# Patient Record
Sex: MALE | Race: WHITE
[De-identification: names, ages, dates, MRNs, and addresses within clinical notes are randomized per-mention and may not be internally consistent; named-entity substitution may affect disease eponyms.]

---

## 2017-10-01 ENCOUNTER — HOSPITAL ENCOUNTER (EMERGENCY)
Dept: HOSPITAL 59 - ER | Age: 47
Discharge: HOME | End: 2017-10-01
Payer: COMMERCIAL
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
## 2017-10-01 DIAGNOSIS — S43.102A: Primary | ICD-10-CM

## 2017-10-01 PROCEDURE — 99283 EMERGENCY DEPT VISIT LOW MDM: CPT
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
## 2017-10-02 NOTE — RADIOLOGY REPORT
EXAM:  LEFT SHOULDER



HISTORY:  PATIENT FELL ON LEFT SHOULDER WITH PAIN.  



TECHNIQUE:  Three views of the left shoulder were obtained.  



Comparison:  None.  



Encounter:  Initial.



FINDINGS:  The glenohumeral joint appears intact with no fracture or 
dislocation of the glenohumeral joint evident.  



There does appear to be some deformity of the lateral aspect of the clavicle 
probably representing an old healed fracture although less obvious on a prior 
chest x-ray of 6/22/17.  In addition, there is now offset at the left 
acromioclavicular joint which was not apparent on the prior study probably 
representing an acute left acromioclavicular joint injury with dislocation of 
the lateral end of the clavicle superiorly relative to the acromion as well as 
some widening of the acromioclavicular joint.  There is a bony projection along 
the inferior aspect of the distal clavicle which appears chronic in nature 
although was not readily seen on the prior chest x-ray as well.  This may be 
residual deformity from an old clavicular fracture and clinical correlation as 
to whether there is a history of prior left clavicle injury would be useful.  
Comparison with any prior left shoulder or clavicle series would also be useful 
in this regard.  



IMPRESSION:  

1.  THE GLENOHUMERAL JOINT APPEARS NEGATIVE.  



2.  APPARENT DISLOCATION AT THE LEFT ACROMIOCLAVICULAR JOINT WHICH APPEARS NEW 
COMPARED WITH THE CHEST X-RAY OF 6/22/17.  DEFORMITY LATERALLY IN THE LEFT 
CLAVICLE ITSELF IS PROBABLY CHRONIC IN NATURE REPRESENTING AN OLD CLAVICULAR 
FRACTURE, BUT CLINICAL CORRELATION IS SUGGESTED.  



JOB NUMBER:  [id number]
MTDD

## 2020-02-14 ENCOUNTER — HOSPITAL ENCOUNTER (EMERGENCY)
Dept: HOSPITAL 59 - ER | Age: 50
Discharge: HOME | End: 2020-02-14
Payer: COMMERCIAL

## 2020-02-14 DIAGNOSIS — T63.481A: Primary | ICD-10-CM

## 2020-02-14 DIAGNOSIS — R22.0: ICD-10-CM

## 2020-02-14 DIAGNOSIS — I10: ICD-10-CM

## 2020-02-14 DIAGNOSIS — R06.2: ICD-10-CM

## 2020-02-14 DIAGNOSIS — R21: ICD-10-CM

## 2020-02-14 DIAGNOSIS — F17.210: ICD-10-CM

## 2020-02-14 LAB
ABSOLUTE NEUTROPHIL COUNT: 2.02
ANION GAP SERPL CALC-SCNC: 17 MMOL/L (ref 7–16)
BUN SERPL-MCNC: 12 MG/DL (ref 6–20)
CO2 SERPL-SCNC: 27 MMOL/L (ref 22–29)
CREAT SERPL-MCNC: 0.8 MG/DL (ref 0.7–1.2)
EOSINOPHIL NFR BLD: 2 % (ref 0–6)
ERYTHROCYTE [DISTWIDTH] IN BLOOD BY AUTOMATED COUNT: 15.6 % (ref 11.5–14.5)
EST GLOMERULAR FILTRATION RATE: > 60 ML/MIN
GLUCOSE SERPL-MCNC: 97 MG/DL (ref 74–109)
HCT VFR BLD CALC: 47.7 % (ref 42–52)
HGB BLD-MCNC: 15.6 GM/DL (ref 14–18)
LYMPHOCYTES NFR BLD: 48 % (ref 16–45)
MCH RBC QN AUTO: 31.8 PG (ref 27–33)
MCHC RBC AUTO-ENTMCNC: 32.7 G/DL (ref 32–36)
MCV RBC AUTO: 97.3 FL (ref 81–97)
MONOCYTES NFR BLD: 11 % (ref 0–9)
NEUTROPHILS NFR BLD AUTO: 39 % (ref 47–80)
PLATELET # BLD EST: NORMAL 10*3/UL
PLATELET # BLD: 250 K/UL (ref 130–400)
PMV BLD AUTO: 10.6 FL (ref 7.4–10.4)
RBC # BLD AUTO: 4.9 M/UL (ref 4.4–5.7)
RBC MORPH BLD: NORMAL
WBC # BLD AUTO: 4.9 K/UL (ref 4.2–12.2)

## 2020-02-14 PROCEDURE — 83036 HEMOGLOBIN GLYCOSYLATED A1C: CPT

## 2020-02-14 PROCEDURE — 96375 TX/PRO/DX INJ NEW DRUG ADDON: CPT

## 2020-02-14 PROCEDURE — 96365 THER/PROPH/DIAG IV INF INIT: CPT

## 2020-02-14 PROCEDURE — 71046 X-RAY EXAM CHEST 2 VIEWS: CPT

## 2020-02-14 PROCEDURE — 85025 COMPLETE CBC W/AUTO DIFF WBC: CPT

## 2020-02-14 PROCEDURE — 84443 ASSAY THYROID STIM HORMONE: CPT

## 2020-02-14 PROCEDURE — 85027 COMPLETE CBC AUTOMATED: CPT

## 2020-02-14 PROCEDURE — 94640 AIRWAY INHALATION TREATMENT: CPT

## 2020-02-14 PROCEDURE — 99284 EMERGENCY DEPT VISIT MOD MDM: CPT

## 2020-02-14 PROCEDURE — 80053 COMPREHEN METABOLIC PANEL: CPT

## 2020-02-14 PROCEDURE — 80048 BASIC METABOLIC PNL TOTAL CA: CPT

## 2020-02-14 PROCEDURE — 80061 LIPID PANEL: CPT

## 2020-02-14 NOTE — RADIOLOGY REPORT
EXAMINATION: Two View Chest Radiographs

EXAM DATE:  2/14/2020 10:08 AM



TECHNIQUE:  Frontal and lateral views



INDICATION:  wheeze

COMPARISON:  11/19/2018, 6/20/2017



ENCOUNTER: Initial

_________________________



FINDINGS:



Cardiomediastinal silhouette: no abnormality.

Peripheral pulmonary vasculature: nondilated.

There is no airspace disease. 



There is no pleural fluid.

There is no acute osseous abnormality.

Nonacute previous fractures of the anterior lateral aspects of right ribs nine and 10, unchanged from
 11/19/2018. Abnormality of the posterior lateral aspect of right rib 10. Although not well seen on t
he 11/19/2018 study, it appears to be present on that study consistent with a nonacute previous rib f
racture.. Slight abnormality of rib contour of the left seventh rib anteriorly unchanged from that da
te and also from 6/20/2017.





IMPRESSION:



No acute cardiopulmonary disease.



Previous right rib fractures.



Dictated by: Butch Garcia MD on 2/14/2020 10:34 AM.

Electronically signed by: Butch Garcia MD on 2/14/2020 10:44 AM.
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
## 2020-02-18 ENCOUNTER — HOSPITAL ENCOUNTER (EMERGENCY)
Dept: HOSPITAL 59 - ER | Age: 50
Discharge: HOME | End: 2020-02-18
Payer: COMMERCIAL

## 2020-02-18 DIAGNOSIS — F10.129: ICD-10-CM
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
## 2020-02-18 DIAGNOSIS — F17.210: ICD-10-CM

## 2020-02-18 DIAGNOSIS — R11.0: ICD-10-CM

## 2020-02-18 DIAGNOSIS — I10: Primary | ICD-10-CM

## 2020-02-18 LAB
ABSOLUTE NEUTROPHIL COUNT: 4.72
ALBUMIN SERPL-MCNC: 4.3 G/DL (ref 4–5)
ALBUMIN/GLOB SERPL: 1.4 {RATIO} (ref 1.1–1.8)
ALP SERPL-CCNC: 76 U/L (ref 40–129)
ALT SERPL-CCNC: 59 U/L (ref ?–41)
ANION GAP SERPL CALC-SCNC: 15 MMOL/L (ref 7–16)
AST SERPL-CCNC: 41 U/L (ref 10–50)
BILIRUB SERPL-MCNC: 0.3 MG/DL (ref 0.2–1)
BUN SERPL-MCNC: 13 MG/DL (ref 6–20)
CO2 SERPL-SCNC: 29 MMOL/L (ref 22–29)
CREAT SERPL-MCNC: 0.8 MG/DL (ref 0.7–1.2)
ERYTHROCYTE [DISTWIDTH] IN BLOOD BY AUTOMATED COUNT: 15.8 % (ref 11.5–14.5)
EST GLOMERULAR FILTRATION RATE: > 60 ML/MIN
GLOBULIN SER-MCNC: 3 GM/DL (ref 1.4–4.8)
GLUCOSE SERPL-MCNC: 116 MG/DL (ref 74–109)
HCT VFR BLD CALC: 45.6 % (ref 42–52)
HGB BLD-MCNC: 14.5 GM/DL (ref 14–18)
LYMPHOCYTES NFR BLD: 16 % (ref 16–45)
MCH RBC QN AUTO: 31.5 PG (ref 27–33)
MCHC RBC AUTO-ENTMCNC: 31.8 G/DL (ref 32–36)
MCV RBC AUTO: 98.9 FL (ref 81–97)
MONOCYTES NFR BLD: 1 % (ref 0–9)
NEUTROPHILS NFR BLD AUTO: 83 % (ref 47–80)
PLATELET # BLD EST: NORMAL 10*3/UL
PLATELET # BLD: 249 K/UL (ref 130–400)
PMV BLD AUTO: 10.2 FL (ref 7.4–10.4)
PROT SERPL-MCNC: 7.3 G/DL (ref 6.6–8.7)
RBC # BLD AUTO: 4.61 M/UL (ref 4.4–5.7)
RBC MORPH BLD: NORMAL
WBC # BLD AUTO: 5.7 K/UL (ref 4.2–12.2)

## 2020-02-18 PROCEDURE — 93010 ELECTROCARDIOGRAM REPORT: CPT

## 2020-02-18 PROCEDURE — 85027 COMPLETE CBC AUTOMATED: CPT

## 2020-02-18 PROCEDURE — 80320 DRUG SCREEN QUANTALCOHOLS: CPT

## 2020-02-18 PROCEDURE — 93005 ELECTROCARDIOGRAM TRACING: CPT

## 2020-02-18 PROCEDURE — 84484 ASSAY OF TROPONIN QUANT: CPT

## 2020-02-18 PROCEDURE — 99284 EMERGENCY DEPT VISIT MOD MDM: CPT

## 2020-02-18 PROCEDURE — 80053 COMPREHEN METABOLIC PANEL: CPT

## 2020-02-18 NOTE — EMERGENCY DEPARTMENT RECORD
History of Present Illness





- General


Chief Complaint: Hypertension


Stated Complaint: HIGH BLOOD PRESSURE


Time Seen by Provider: 20 08:05


Source: Patient


Mode of Arrival: Ambulatory


Limitations: No limitations





- History of Present Illness


Initial Comments: 





pt came in because he 'felt funny'.  he had a hard time describing what that 

was.  he stated he felt anxious.  he has stopped takeing his coreg because he 

thought it was making him swell.  he has been on steroids for allergic rxn. he 

has not taken his hctz this am. he has no cp or ha. his so took his bp and found

it to be 256/  and brought him in


Onset/Timin


-: Hour(s)


Timing: Gradual onset


Improves With: Nothing


Worsens With: Nothing


Associated Symptoms: Other





- Placedo Coma Scale


Eye Response: (4) Open spontaneously


Motor Response: (6) Obeys commands


Verbal Response: (5) Oriented


Placedo Total: 15





- Related Data


                                  Previous Rx's











 Medication  Instructions  Recorded


 


Diphenhydramine HCl [Benadryl] 25 mg PO Q6H #20 cap 20


 


Prednisone [Prednisone 20Mg] 20 mg PO BID #12 tab 20


 


Ranitidine HCl [Zantac] 150 mg PO BID #14 tablet 20











                                    Allergies











Allergy/AdvReac Type Severity Reaction Status Date / Time


 


cottonseed oil AdvReac  VOMITING Verified 20 08:10


 


soybean AdvReac  VOMITING Verified 20 08:10














Travel/Exposure Screening





- Travel/Exposure Within Last 30 Days


Have you traveled within the last 30 days?: No





- Travel/Exposure Within Last Year


Have you traveled outside the [place] in the last year?: No





- Additonal Travel/Exposure Details


Have you been exposed to anyone with a communicable illness?: No





- Travel Symptoms


Symptom Screening: None





Review of Systems


Reviewed: No additional complaints except as noted below


Constitutional: Reports: As per HPI.  Denies: Chills, Fever, Malaise, Night 

sweats, Weakness, Weight change


Eyes: Reports: As per HPI.  Denies: Eye pain, Photophobia, Vision change


ENT: Reports: As per HPI.  Denies: Congestion, Dental pain, Ear pain, Epistaxis,

 Hearing loss, Throat pain


Respiratory: Reports: As per HPI.  Denies: Cough, Dyspnea, Wheezes


Cardiovascular: Reports: As per HPI, Edema.  Denies: Arrhythmia, Chest pain, 

Dyspnea on exertion, Murmurs, Orthopnea, Palpitations, Paroxysmal nocturnal 

dyspnea, Syncope


Endocrine: Reports: As per HPI.  Denies: Fatigue, Heat or cold intolerance, 

Polydipsia, Polyuria


Gastrointestinal: Reports: As per HPI.  Denies: Abdominal pain, Constipation, 

Diarrhea, Hematemesis, Hematochezia, Melena, Nausea, Vomiting


Genitourinary: Reports: As per HPI.  Denies: Dysuria, Frequency, Hematuria, 

Incontinence, Retention, Testicular pain, Testicular mass, Urgency


Musculoskeletal: Reports: As per HPI, Back pain.  Denies: Arthralgia, Gout, 

Joint swelling, Myalgia, Neck pain


Skin: Reports: As per HPI.  Denies: Bruising, Change in color, Change in 

hair/nails, Lesions, Pruritus, Rash


Neurological: Reports: As per HPI.  Denies: Abnormal gait, Confusion, Headache, 

Numbness, Paresthesias, Seizure, Tingling, Tremors, Vertigo, Weakness


Psychiatric: Reports: As per HPI, Anxiety.  Denies: Auditory hallucinations, 

Depression, Homicidal thoughts, Suicidal thoughts, Visual hallucinations


Hematological/Lymphatic: Reports: As per HPI.  Denies: Anemia, Blood Clots, Easy

 bleeding, Easy bruising, Swollen glands





Past Medical History





- SOCIAL HISTORY


Smoking Status: Current every day smoker


Alcohol Use: Heavy


Drug Use: Occasional


Drug Use Detail:: Marijuana





- RESPIRATORY


Hx Respiratory Disorders: No


Hx Bronchitis: Yes


Hx Pneumonia: Yes





- CARDIOVASCULAR


Hx Cardio Disorders: Yes


Hx Hypertension: Yes





- NEURO


Hx Neuro Disorders: No


Hx Dizziness: Yes





- GI


Hx GI Disorders: No





- 


Hx Genitourinary Disorders: No





- ENDOCRINE


Hx Endocrine Disorders: No





- MUSCULOSKELETAL


Hx Back Injury: Yes





- PSYCH


Hx Psych Problems: No





- HEMATOLOGY/ONCOLOGY


Hx Hematology/Oncology Disorders: No





Family Medical History


Any Significant Family History?: No





Physical Exam





- General


General Appearance: Alert, Oriented x3, Cooperative, No acute distress





- Head


Head exam: Normal inspection





- Eye


Eye exam: Normal appearance, PERRL, EOMI


Pupils: Normal accommodation





- ENT


ENT exam: Normal exam, Mucous membranes moist, Normal external ear exam, Normal 

orophraynx


Ear exam: Normal external inspection.  negative: External canal tenderness


Nasal Exam: Normal inspection.  negative: Discharge, Sinus tenderness


Mouth exam: Normal external inspection, Tongue normal


Teeth exam: Normal inspection.  negative: Dental caries


Throat exam: Normal inspection.  negative: Tonsillar erythema, Tonsillar exudate





- Neck


Neck exam: Normal inspection, Full ROM.  negative: Tenderness





- Respiratory


Respiratory exam: Normal lung sounds bilaterally.  negative: Respiratory 

distress





- Cardiovascular


Cardiovascular Exam: Normal rhythm, Normal heart sounds, Tachycardia





- GI/Abdominal


GI/Abdominal exam: Soft, Normal bowel sounds.  negative: Tenderness





- Rectal


Rectal exam: Deferred





- 


 exam: Deferred





- Extremities


Extremities exam: Normal inspection, Full ROM, Normal capillary refill.  

negative: Tenderness





- Back


Back exam: Reports: Normal inspection, Full ROM.  Denies: Muscle spasm, Rash 

noted, Tenderness





- Neurological


Neurological exam: Alert, CN II-XII intact, Normal gait, Oriented X3





- Psychiatric


Psychiatric exam: Normal affect, Normal mood





- Skin


Skin exam: Dry, Intact, Normal color, Warm





Course





                                   Vital Signs











  20





  08:01 09:14


 


Temperature 98.7 F 


 


Pulse Rate 110 H 


 


Pulse Rate [  102 H





Pulse Ox Probe]  


 


Respiratory 18 16





Rate  


 


Blood Pressure 204/129 


 


Blood Pressure  180/129





[Right Arm]  


 


Pulse Ox 94 L 92 L














- Reevaluation(s)


Reevaluation #1: 





20 10:40


pt feels better


20 10:40


pt warned about abuse of alcohol and of driving with alcohol





Medical Decision Making





- Lab Data


Result diagrams: 


                                 20 08:29





                                 20 08:29





                                   Lab Results











  20 Range/Units





  08:29 08:29 


 


WBC  5.7   (4.2-12.2)  K/uL


 


RBC  4.61   (4.40-5.70)  M/uL


 


Hgb  14.5   (14.0-18.0)  gm/dl


 


Hct  45.6   (42.0-52.0)  %


 


MCV  98.9 H   (81-97)  fl


 


MCH  31.5   (27-33)  pg


 


MCHC  31.8 L   (32-36)  g/dl


 


RDW  15.8 H   (11.5-14.5)  %


 


Plt Count  249   (130-400)  K/uL


 


MPV  10.2   (7.4-10.4)  fl


 


Neutrophils %  83.0 H   (47-80)  %


 


Eosinophils %  Not Reportable   


 


Basophils %  Not Reportable   


 


Absolute Neutrophils  4.72   


 


Lymphocytes  16.0   (16-45)  %


 


Monocytes  1.0   (0-9)  %


 


Platelet Estimate  Normal   (NORMAL)  


 


RBC Morphology  Normal   


 


Sodium   140  (136-145)  mmol/L


 


Potassium   4.0  (3.4-4.5)  mmol/L


 


Chloride   96 L  ()  mmol/L


 


Carbon Dioxide   29.0  (22-29)  mmol/L


 


Anion Gap   15.0  (7-16)  


 


BUN   13  (6-20)  mg/dL


 


Creatinine   0.8  (0.7-1.2)  mg/dL


 


Estimated GFR   > 60  mL/min


 


Random Glucose   116 H  ()  mg/dL


 


Calcium   8.7  (8.6-10.0)  mg/dL


 


Total Bilirubin   0.30  (0.2-1.0)  mg/dL


 


AST   41  (10.0-50.0)  U/L


 


ALT   59 H  (<41)  U/L


 


Alkaline Phosphatase   76  ()  U/L


 


Troponin T   < 0.010  (0-0.010)  ng/mL


 


Total Protein   7.3  (6.6-8.7)  g/dL


 


Albumin   4.3  (4.0-5.0)  g/dL


 


Globulin   3.0  (1.4-4.8)  gm/dL


 


Albumin/Globulin Ratio   1.4  (1.1-1.8)  














Disposition


Disposition: Discharge


Clinical Impression: 


 Alcohol abuse





HTN (hypertension)


Qualifiers:


 Hypertension type: essential hypertension Qualified Code(s): I10 - Essential 

(primary) hypertension





Disposition: Home, Self-Care


Condition: (1) Good


Instructions:  Hypertension (ED), Alcohol Dependence (ED), Alcohol Use Disorder 

(ED)


Additional Instructions: 


take hypertensive meds as prescribed.  recheck blood pressure daily.  follow up 

with family doctor within next few days.  decrease alcohol. rest


Forms:  Patient Portal Access, Return to Work/School





Quality





- Quality Measures


Quality Measures: N/A





- Blood Pressure Screening


Does Patient Have Any of the Following: Active Dx of HTN


Blood Pressure Classification: Hypertensive Reading


Systolic Measurement: 204


Diastolic Measurement: 129


Screening for High Blood Pressure: Patient Exclusion, Hx of HTN []

## 2020-02-19 ENCOUNTER — HOSPITAL ENCOUNTER (OUTPATIENT)
Dept: HOSPITAL 59 - ER | Age: 50
Setting detail: OBSERVATION
Discharge: HOME | End: 2020-02-19
Attending: INTERNAL MEDICINE | Admitting: INTERNAL MEDICINE
Payer: COMMERCIAL

## 2020-02-19 DIAGNOSIS — Z29.9: ICD-10-CM

## 2020-02-19 DIAGNOSIS — F10.10: ICD-10-CM

## 2020-02-19 DIAGNOSIS — I10: ICD-10-CM

## 2020-02-19 DIAGNOSIS — T78.40XA: ICD-10-CM

## 2020-02-19 DIAGNOSIS — Z78.9: ICD-10-CM

## 2020-02-19 DIAGNOSIS — F17.210: ICD-10-CM

## 2020-02-19 DIAGNOSIS — T78.3XXD: Primary | ICD-10-CM

## 2020-02-19 PROCEDURE — 99236 HOSP IP/OBS SAME DATE HI 85: CPT

## 2020-02-19 PROCEDURE — 99285 EMERGENCY DEPT VISIT HI MDM: CPT

## 2020-02-19 PROCEDURE — 96374 THER/PROPH/DIAG INJ IV PUSH: CPT

## 2020-02-19 PROCEDURE — 96375 TX/PRO/DX INJ NEW DRUG ADDON: CPT

## 2020-02-19 RX ADMIN — LORAZEPAM PRN MG: 2 INJECTION INTRAMUSCULAR; INTRAVENOUS at 13:24

## 2020-02-19 RX ADMIN — LORAZEPAM PRN MG: 2 INJECTION INTRAMUSCULAR; INTRAVENOUS at 09:48

## 2020-02-19 NOTE — DISCHARGE SUMMARY
Providers


Discharge Summary Date: 02/19/20


Date of admission: 


02/19/20 02:34





Expected Date of Discharge: 02/19/20


Attending physician: 


USAMA KERR





Primary care physician: 


ZULLY FERGUSON M.D.








Physical Exam





- Vital Signs


Vital Signs: 


                            Vital Signs - Last 24 Hrs











  Temp Pulse Pulse Resp BP BP BP


 


 02/19/20 11:06    118 H  16    165/94


 


 02/19/20 09:24  98.3 F   112 H  16    193/120


 


 02/19/20 08:20  98.6 F   117 H  20   208/148  199/139


 


 02/19/20 04:39  98.6 F   83  16    168/113


 


 02/19/20 03:36    82  16   


 


 02/19/20 02:39  98.5 F   82  16    161/102


 


 02/19/20 02:33   82   16  151/112  


 


 02/19/20 01:45  98.1 F   90  20   163/114 














  Pulse Ox


 


 02/19/20 11:06 


 


 02/19/20 09:24  97


 


 02/19/20 08:20  97


 


 02/19/20 04:39  95


 


 02/19/20 03:36 


 


 02/19/20 02:39  96


 


 02/19/20 02:33  96


 


 02/19/20 01:45  95














- General


General Appearance: Alert, Oriented x3, Cooperative, Mild distress, Anxious


Limitations: No limitations





- Head


Head exam: Atraumatic, Normocephalic, Normal inspection


Head exam detail: negative: Abrasion, Contusion, Vasquez's sign, General 

tenderness, Hematoma, Laceration





- Eye


Eye exam: Normal appearance, PERRL.  negative: Conjunctival injection, Melody

orbital tenderness, Scleral icterus





- ENT


ENT exam: Mucous membranes moist


Ear exam: Normal external inspection.  negative: Auricular hematoma, Auricular 

trauma


Nasal Exam: Normal inspection.  negative: Active bleeding, Discharge, Dried 

blood, Foreign body


Mouth exam: Normal external inspection.  negative: Drooling, Laceration, Muffled

voice, Tongue elevation


Throat exam: negative: Tonsillar erythema, Tonsillomegaly, R peritonsillar mass,

L peritonsillar mass





- Neck


Neck exam: Normal inspection.  negative: Meningismus, Tenderness





- Respiratory


Respiratory exam: Normal lung sounds bilaterally.  negative: Rales, Respiratory 

distress, Rhonchi, Stridor





- Cardiovascular


Cardiovascular Exam: Regular rate, Normal rhythm, Normal heart sounds


Peripheral Pulses: 2+: Radial (R), Radial (L), Dorsalis Pedis (R), Dorsalis 

Pedis (L)





- GI/Abdominal


GI/Abdominal exam: Soft.  negative: Rebound, Rigid, Tenderness





- Rectal


Rectal exam: Deferred





- 


 exam: Deferred





- Extremities


Extremities exam: Normal inspection.  negative: Pedal edema, Tenderness





- Back


Back exam: Denies: CVA tenderness (R), CVA tenderness (L)





- Neurological


Neurological exam: Alert, Normal gait, Oriented X3





- Psychiatric


Psychiatric exam: Anxious, Normal mood





- Skin


Skin exam: Normal color.  negative: Abrasion


Type of lesion: Rash (hives to bilateral arms).  negative: abrasion





Hospitalization





- Hospitalization


Admission Diagnosis: Angioedema





- Problem List/Discharge Diagnosis


(1) Angioedema


Current Visit: Yes   Status: Acute   


Discharge Diagnosis: 


   Encounter type: subsequent encounter   Qualified Code(s): T78.3XXD - 

Angioneurotic edema, subsequent encounter   


Base Code: T78.3XXA - ANGIONEUROTIC EDEMA, INITIAL ENCOUNTER   Comment: 2/19/20:


- 2 episodes of lip swelling after taking Coreg and HCTZ


- DC both medications, updated allergies


- Solumedrol 60mg IVP q8h, continue Prednisone x 5 days


- Pepcid 40mg IVP q12h, continue Zantac at home x 5 days


- Benadryl 25mg IVP q6h, continue PO prn


- No further symptoms since starting Norvasc   





(2) Allergic reaction


Current Visit: No   Status: Acute   Base Code: T78.40XA - ALLERGY, UNSPECIFIED, 

INITIAL ENCOUNTER   Comment: 2/19/20:


- Hives improved since this morning


- Possible continued rxn to HCTZ, as patient received last dose this morning


- Continue to treat with Solumedrol, Pepcid, and Benadryl   





(3) HTN (hypertension)


Current Visit: No   Status: Acute   


Discharge Diagnosis: 


   Hypertension type: essential hypertension   Qualified Code(s): I10 - 

Essential (primary) hypertension   


Base Code: I10 - ESSENTIAL (PRIMARY) HYPERTENSION   Comment: 2/19/20:


- DC Coreg and HCTZ


- BP remains elevated, but patient anxious and pacing at this time


- Starting Norvasc 10mg daily, no reaction, continue outpatient


- Follow-up with Dr. shayna scheduled for further HTN management   





(4) Alcohol abuse


Current Visit: No   Status: Acute   Base Code: F10.10 - ALCOHOL ABUSE, 

UNCOMPLICATED   Comment: 2/19/20:


- Reports drinking daily, admits to 4 beers daily


- Tremors and sweating noted, last drink reported 24 hours ago


- CIWA scale started, with Ativan parameters noted   





(5) DVT prophylaxis


Current Visit: Yes   Status: Acute   Base Code: Z29.9 - ENCOUNTER FOR 

PROPHYLACTIC MEASURES, UNSPECIFIED   Comment: 2/19/20:


- Moderate risk due to hospitalization


- Lovenox 40mg SC daily   





(6) Full code status


Current Visit: Yes   Status: Acute   Base Code: Z78.9 - OTHER SPECIFIED HEALTH 

STATUS   Comment: 2/19/20:


- Full code this admission   





- Hospitalization Course


Disposition: Home, Self-Care


Hospital Course: 





49 year old male patient presented to ED for evaluation of lower lip swelling.  

Patient has had multiple ED visits since 2/14/20 due to facial swelling and 

hives.  Patient recently started on Coreg and HCTZ 2/14/20 due to uncontrolled 

HTN.  Patient has not been able to establish with a PCP until 3/14, and was 

started on these medications through [facility], [place].  Patient states that his first 

dose of both were on 2/14/2020.  He also received a dose of Bactrim at that time

for a skin infection.  Patient was treated with prednisone and instructed to 

stop the Coreg.  Patient reports checking his BP at home 2/18 due to "feeling 

funny" and noted it to be >200/100.  Patient was then again seen in the ED, had 

a normal EKG, and negative lab reviews.  Patient was instructed to restart the 

Coreg and HCTZ at that time due to symptomatic hypertension.  Later that day 

(2/18), patient again noted hives and lower lip swelling.  Patient then went 

back to ED.  Denied SOB, CP, or difficulty breathing.  Reports being a current 

ppd smoker > 30 years.  Daily alcohol use (4 beers daily).





No current PCP





ED Course:


-2/14/20: CXR unremarkable, CBC, CMP unremarkable


-2/18/20: EKG - sinus tachycardia


Started on Solumedrol, pepcid, and benadryl





2/19/20: Patient A&O x 4, sitting on edge of bed.  Patient noting tremors and 

sweating, reports last drink 24 hours ago.  Continues to have hives on bilateral

arms, no further swelling of lips.  Patient will be started on Norvasc for HTN 

management, and continue to monitor for further reaction.





Update: No further symptoms of angioedema, all hives have resolved.  Patient 

remains hypertensive, but pacing in room and anxious.  Patient denies CP, SOB, 

headache, or dizziness.  Continue Norvasc 10mg daily, follow-up with PCP for 

further management.





Condition at Discharge: (2) Stable





Discharge Medications





- Discharge Medications


Prescriptions: 


Amlodipine Besylate [Norvasc] 10 mg PO DAILY #30 tab


Home Medications: 


                                Ambulatory Orders





Diphenhydramine HCl [Benadryl] 25 mg PO Q6H #20 cap 02/14/20 [Last Taken 

02/16/20]


Prednisone [Prednisone 20Mg] 20 mg PO BID #12 tab 02/14/20 [Last Taken 02/17/20]


Ranitidine HCl [Zantac] 150 mg PO BID #14 tablet 02/14/20 [Last Taken 02/18/20]


Amlodipine Besylate [Norvasc] 10 mg PO DAILY #30 tab 02/19/20 [Last Taken 

Unknown]


Diphenhydramine HCl IV/IM [Benadryl] 25 mg IVP Q6H PRN  vial 02/19/20 [Last 

Taken Unknown]











Discharge Plan





- Discharge Instructions


Activity at Discharge: Increase Activity as Tolerated


Diet at Discharge: Regular Diet


Additional Instructions: 


Discontinue Coreg and Hydrochlorothiazide





Start taking Norvasc daily


Complete the Prednisone and take Zantac for 5 additional days


Continue Benadryl as needed





Follow-up with Dr. Ferguson as scheduled





Quality Measures





- Quality Measures


Quality Measures: Documentation of Current Medications in Medical Record, 

Screening for High Blood Pressure and F/U Documented





- Current Medications


Quality Measure: Measure #130: Documentation of Current Medications


Documentation of Current Medications: <Current Medications Documented/Reviewed> 

[]





- Blood Pressure Screening


Quality Measure: Screening for High Blood Pressure and Follow-Up Documented


Does Patient Have Any of the Following: Active Dx of HTN


Blood Pressure Classification: Hypertensive Reading


Systolic Measurement: 151


Diastolic Measurement: 112


Screening for High Blood Pressure: Patient Exclusion, Hx of HTN []





- Elder Abuse Suspicion Index


EASI Reference Information: Rudy BANDA, Howie C, Sophie D, Ana DAVIS.Development

 and validation of a tool to assist physicians identification of elder abuse: 

The Elder Abuse Suspicion  Index (EASI ).  Journal of Elder Abuse and Neglect, 

2008; 20 (3): 276-300.

## 2020-02-19 NOTE — EMERGENCY DEPARTMENT RECORD
History of Present Illness





- General


Chief complaint: Facial Swelling


Stated complaint: LIP SWELLING


Time Seen by Provider: 02/19/20 01:38


Source: Patient


Mode of Arrival: Ambulatory


Limitations: No limitations





- History of Present Illness


Initial Comments: 





48 yo male presents to ED for re-evaluation following ED visit 2/14 and earlier 

this morning.  Patient was seen 2/14 for (3) bites to the posterior aspect of 

the neck and upper lip swelling, and had started a new BP medication earlier in 

the week (Coreg)  Patient was treated and evaluated for angio-edema, underwent 

numerous laboratory and chest radiograph to exclude mediastinal mass resulting 

in SVC syndrome.  Patient improved in the ED, was discharged home on Prednisone,

Benadryl, and Pepcid.  Coreg was held at that time.  Patient returned to the ED 

this morning for elevated blood pressure, reports that his Coreg was restarted. 

Patient reports that he awoke this morning with swelling of the lower lip this 

evening.  Patient denies throat swelling or difficulty in breathing symptoms.


MD Complaint: Allergic reaction, Facial swelling


-: Hour(s)


Exposure: Medication


Symptoms: Itching, Lip swelling


Severity: Moderate





- Related Data


                                  Previous Rx's











 Medication  Instructions  Recorded


 


Diphenhydramine HCl [Benadryl] 25 mg PO Q6H #20 cap 02/14/20


 


Prednisone [Prednisone 20Mg] 20 mg PO BID #12 tab 02/14/20


 


Ranitidine HCl [Zantac] 150 mg PO BID #14 tablet 02/14/20











                                    Allergies











Allergy/AdvReac Type Severity Reaction Status Date / Time


 


cottonseed oil AdvReac  VOMITING Verified 02/19/20 01:47


 


soybean AdvReac  VOMITING Verified 02/19/20 01:47














Travel/Exposure Screening





- Travel/Exposure Within Last 30 Days


Have you traveled within the last 30 days?: No





- Travel/Exposure Within Last Year


Have you traveled outside the [place] in the last year?: No





- Additonal Travel/Exposure Details


Have you been exposed to anyone with a communicable illness?: No





- Travel Symptoms


Symptom Screening: None





Review of Systems


Constitutional: Denies: Chills, Fever, Malaise, Night sweats


Eyes: Denies: Eye discharge, Eye pain


ENT: Reports: Other (Lip swelling).  Denies: Congestion, Ear pain, Epistaxis


Respiratory: Denies: Cough, Dyspnea


Cardiovascular: Denies: Chest pain, Dyspnea on exertion


Endocrine: Denies: Fatigue, Heat or cold intolerance


Gastrointestinal: Denies: Abdominal pain, Nausea, Vomiting


Genitourinary: Denies: Incontinence, Retention


Musculoskeletal: Denies: Arthralgia, Back pain


Skin: Denies: Bruising, Change in color


Neurological: Denies: Abnormal gait, Confusion, Headache, Seizure


Psychiatric: Denies: Anxiety


Hematological/Lymphatic: Denies: Anemia, Blood Clots





Past Medical History





- SOCIAL HISTORY


Smoking Status: Current every day smoker


Alcohol Use: Heavy


Drug Use Detail:: Marijuana





- RESPIRATORY


Hx Respiratory Disorders: Yes


Hx Bronchitis: Yes


Hx Pneumonia: Yes





- CARDIOVASCULAR


Hx Cardio Disorders: Yes


Hx Hypertension: Yes





- NEURO


Hx Neuro Disorders: Yes


Hx Dizziness: Yes





- GI


Hx GI Disorders: No





- 


Hx Genitourinary Disorders: No





- ENDOCRINE


Hx Endocrine Disorders: No





- MUSCULOSKELETAL


Hx Musculoskeletal Disorders: Yes


Hx Back Injury: Yes





- PSYCH


Hx Psych Problems: No





- HEMATOLOGY/ONCOLOGY


Hx Hematology/Oncology Disorders: No





Family Medical History


Any Significant Family History?: No





Physical Exam





- General


General Appearance: Alert, Oriented x3, Cooperative, Moderate distress


Limitations: No limitations





- Head


Head exam: Atraumatic, Normocephalic, Normal inspection


Head exam detail: negative: Abrasion, Contusion, Vasquez's sign, General 

tenderness, Hematoma, Laceration





- Eye


Eye exam: Periorbital swelling.  negative: Conjunctival injection, Periorbital 

tenderness, Scleral icterus





- ENT


Ear exam: negative: Auricular hematoma, Auricular trauma


Nasal Exam: negative: Active bleeding, Discharge, Dried blood, Foreign body


Mouth exam: Other (Edema to the lower lip is present on examination).  negative:

 Drooling, Laceration, Muffled voice, Tongue elevation


Throat exam: negative: Tonsillar erythema, Tonsillomegaly, R peritonsillar mass,

 L peritonsillar mass





- Neck


Neck exam: Normal inspection.  negative: Meningismus, Tenderness





- Respiratory


Respiratory exam: Normal lung sounds bilaterally.  negative: Rales, Respiratory 

distress, Rhonchi, Stridor





- Cardiovascular


Cardiovascular Exam: Regular rate, Normal rhythm, Normal heart sounds





- GI/Abdominal


GI/Abdominal exam: Soft.  negative: Rebound, Rigid, Tenderness





- Rectal


Rectal exam: Deferred





- 


 exam: Deferred





- Extremities


Extremities exam: Normal inspection.  negative: Pedal edema, Tenderness





- Back


Back exam: Denies: CVA tenderness (R), CVA tenderness (L)





- Neurological


Neurological exam: Alert, Normal gait, Oriented X3





- Psychiatric


Psychiatric exam: Normal affect, Normal mood





- Skin


Skin exam: Normal color.  negative: Abrasion


Type of lesion: negative: abrasion





Course





                                   Vital Signs











  02/19/20





  01:45


 


Temperature 98.1 F


 


Pulse Rate [ 90





Pulse Ox Probe] 


 


Respiratory 20





Rate 


 


Blood Pressure 163/114





[Left Arm] 


 


Pulse Ox 95














- Reevaluation(s)


Reevaluation #1: 





02/19/20 01:55


Previous records from 2/14 and 2/18 were reviewed:





CXR: No acute process


Laboratory studies appear grossly unremarkable for an acute process except for 

an elevated alcohol level.





Patient was seen and examined, will admit for treatment of angio-edema as well 

discontinuation of Coreg and evaluation for another class medication for control

of the patient's blood pressure.





Reevaluation #2: 





02/19/20 02:51


Case was discussed with Vanesa Jurado NP, will accept admission at this time.





Disposition


Disposition: Admit


Clinical Impression: 


Angioedema


Qualifiers:


 Encounter type: subsequent encounter Qualified Code(s): T78.3XXD - 

Angioneurotic edema, subsequent encounter





Disposition: Still a Patient at [facility]


Decision to Admit: Admit from ER


Decision to Admit Date: 02/19/20


Decision to Admit Time: 01:54


Condition: (2) Stable


Time of Disposition: 01:54





Quality





- Quality Measures


Quality Measures: N/A





- Blood Pressure Screening


Does Patient Have Any of the Following: Active Dx of HTN


Blood Pressure Classification: Hypertensive Reading


Systolic Measurement: 151


Diastolic Measurement: 112


Screening for High Blood Pressure: Patient Exclusion, Hx of HTN []

## 2020-02-19 NOTE — HISTORY & PHYSICAL
History of Present Illness





- Date of Service


Date of Service for History & Physical: 02/19/20





- History of Present Illness


Admitting Diagnosis: Angioedema


History of Present Illness: 





49 year old male patient presented to ED for evaluation of lower lip swelling.  

Patient has had multiple ED visits since 2/14/20 due to facial swelling and 

hives.  Patient recently started on Coreg and HCTZ 2/14/20 due to uncontrolled 

HTN.  Patient has not been able to establish with a PCP until 3/14, and was 

started on these medications through [facility].  Patient states that his first 

dose of both were on 2/14/2020.  He also received a dose of Bactrim at that time

for a skin infection.  Patient was treated with prednisone and instructed to 

stop the Coreg.  Patient reports checking his BP at home 2/18 due to "feeling 

funny" and noted it to be >200/100.  Patient was then again seen in the ED, had 

a normal EKG, and negative lab reviews.  Patient was instructed to restart the 

Coreg and HCTZ at that time due to symptomatic hypertension.  Later that day 

(2/18), patient again noted hives and lower lip swelling.  Patient then went 

back to ED.  Denied SOB, CP, or difficulty breathing.  Reports being a current 

ppd smoker > 30 years.  Daily alcohol use (4 beers daily).





No current PCP





ED Course:


-2/14/20: CXR unremarkable, CBC, CMP unremarkable


-2/18/20: EKG - sinus tachycardia


Started on Solumedrol, pepcid, and benadryl





2/19/20: Patient A&O x 4, sitting on edge of bed.  Patient noting tremors and 

sweating, reports last drink 24 hours ago.  Continues to have hives on bilateral

arms, no further swelling of lips.  Patient will be started on Norvasc for HTN m

anagement, and continue to monitor for further reaction.











Travel/Exposure Screening





- Travel/Exposure Within Last 30 Days


Have you traveled within the last 30 days?: No





- Travel/Exposure Within Last Year


Have you traveled outside the [place] in the last year?: No





- Additonal Travel/Exposure Details


Have you been exposed to anyone with a communicable illness?: No





- Travel Symptoms


Symptom Screening: None





Review of Systems


Reviewed: No additional complaints except as noted below


Constitutional: Denies: Chills, Fever, Malaise, Night sweats


Eyes: Denies: Eye discharge, Eye pain


ENT: Reports: Other (lower lip swelling).  Denies: Congestion, Ear pain, 

Epistaxis


Respiratory: Denies: Cough, Dyspnea


Cardiovascular: Denies: Chest pain, Dyspnea on exertion


Endocrine: Denies: Fatigue, Heat or cold intolerance


Gastrointestinal: Denies: Abdominal pain, Nausea, Vomiting


Genitourinary: Denies: Incontinence, Retention


Musculoskeletal: Denies: Arthralgia, Back pain


Skin: Reports: Rash (bilateral hives on arms).  Denies: Bruising, Change in 

color


Neurological: Denies: Abnormal gait, Confusion, Headache, Seizure


Psychiatric: Denies: Anxiety


Hematological/Lymphatic: Denies: Anemia, Blood Clots





Past Medical History





- SOCIAL HISTORY


Smoking Status: Current every day smoker


Alcohol Use: Heavy


Drug Use Detail:: Marijuana





- RESPIRATORY


Hx Respiratory Disorders: Yes


Hx Bronchitis: Yes


Hx Pneumonia: Yes





- CARDIOVASCULAR


Hx Cardio Disorders: Yes


Hx Hypertension: Yes





- NEURO


Hx Neuro Disorders: Yes


Hx Dizziness: Yes





- GI


Hx GI Disorders: No





- 


Hx Genitourinary Disorders: No





- ENDOCRINE


Hx Endocrine Disorders: No





- MUSCULOSKELETAL


Hx Musculoskeletal Disorders: Yes


Hx Back Injury: Yes





- PSYCH


Hx Psych Problems: No





- HEMATOLOGY/ONCOLOGY


Hx Hematology/Oncology Disorders: No





Family Medical History


Any Significant Family History?: No





H&P Meds/Allergies





- Allergies


Allergies: 


                                    Allergies











Allergy/AdvReac Type Severity Reaction Status Date / Time


 


carvedilol Allergy Severe Angioedema Verified 02/19/20 08:31


 


hydrochlorothiazide Allergy Severe Angioedema Verified 02/19/20 09:35


 


cottonseed oil AdvReac  VOMITING Verified 02/19/20 01:47


 


soybean AdvReac  VOMITING Verified 02/19/20 01:47














- Home Medications


                                  Previous Rx's











 Medication  Instructions  Recorded


 


Diphenhydramine HCl [Benadryl] 25 mg PO Q6H #20 cap 02/14/20


 


Prednisone [Prednisone 20Mg] 20 mg PO BID #12 tab 02/14/20


 


Ranitidine HCl [Zantac] 150 mg PO BID #14 tablet 02/14/20














- Active Medications


Active Medications: 


                               Current Medications





Amlodipine Besylate (Norvasc)  10 mg PO DAILY PARUL


   Last Admin: 02/19/20 09:37 Dose:  10 mg


   Documented by: 


Diphenhydramine HCl (Benadryl)  25 mg IVP Q6H PRN


   PRN Reason: ITCHING


   Last Admin: 02/19/20 09:38 Dose:  25 mg


   Documented by: 


Famotidine 20 mg/ Sodium (Chloride)  102 mls @ 408 mls/hr IVPB Q12H [facility]


Lorazepam (Ativan)  1 mg IV Q2H PRN


   PRN Reason: ALCOHOL WITHDRAWAL


   Last Admin: 02/19/20 09:48 Dose:  1 mg


   Documented by: 


Lorazepam (Ativan)  2 mg IV Q1H PRN


   PRN Reason: ALCOHOL WITHDRAWAL


Methylprednisolone Sodium Succinate (Solu-Medrol)  60 mg IVP Q8H [facility]


   Last Admin: 02/19/20 09:38 Dose:  60 mg


   Documented by: 


Miscellaneous (Remove Patch)  1 each TD DAILY [facility]


Nicotine (Nicotine 21mg)  1 patch TD Q24H [facility]


   Last Admin: 02/19/20 06:01 Dose:  1 patch


   Documented by: 











Physical Exam





- Vital Signs


Vital Signs: 


                            Vital Signs - Last 24 Hrs











  Temp Pulse Pulse Resp BP BP BP


 


 02/19/20 09:24  98.3 F   112 H  16    193/120


 


 02/19/20 08:20  98.6 F   117 H  20   208/148  199/139


 


 02/19/20 04:39  98.6 F   83  16    168/113


 


 02/19/20 03:36    82  16   


 


 02/19/20 02:39  98.5 F   82  16    161/102


 


 02/19/20 02:33   82   16  151/112  


 


 02/19/20 01:45  98.1 F   90  20   163/114 














  Pulse Ox


 


 02/19/20 09:24  97


 


 02/19/20 08:20  97


 


 02/19/20 04:39  95


 


 02/19/20 03:36 


 


 02/19/20 02:39  96


 


 02/19/20 02:33  96


 


 02/19/20 01:45  95














- General


General Appearance: Alert, Oriented x3, Cooperative, Mild distress


Limitations: No limitations





- Head


Head exam: Atraumatic, Normocephalic, Normal inspection


Head exam detail: negative: Abrasion, Contusion, Vasquez's sign, General 

tenderness, Hematoma, Laceration





- Eye


Eye exam: Periorbital swelling.  negative: Conjunctival injection, Periorbital 

tenderness, Scleral icterus





- ENT


ENT exam: Mucous membranes moist


Ear exam: Normal external inspection.  negative: Auricular hematoma, Auricular 

trauma


Nasal Exam: Normal inspection.  negative: Active bleeding, Discharge, Dried 

blood, Foreign body


Mouth exam: Normal external inspection.  negative: Drooling, Laceration, Muffled

 voice, Tongue elevation


Throat exam: negative: Tonsillar erythema, Tonsillomegaly, R peritonsillar mass,

 L peritonsillar mass





- Neck


Neck exam: Normal inspection.  negative: Meningismus, Tenderness





- Respiratory


Respiratory exam: Wheezes (scattered).  negative: Rales, Respiratory distress, 

Rhonchi, Stridor





- Cardiovascular


Cardiovascular Exam: Regular rate, Normal rhythm, Normal heart sounds


Peripheral Pulses: 2+: Radial (R), Radial (L), Dorsalis Pedis (R), Dorsalis 

Pedis (L)





- GI/Abdominal


GI/Abdominal exam: Soft.  negative: Rebound, Rigid, Tenderness





- Rectal


Rectal exam: Deferred





- 


 exam: Deferred





- Extremities


Extremities exam: Normal inspection.  negative: Pedal edema, Tenderness





- Back


Back exam: Denies: CVA tenderness (R), CVA tenderness (L)





- Neurological


Neurological exam: Alert, Normal gait, Oriented X3





- Psychiatric


Psychiatric exam: Anxious, Normal mood





- Skin


Skin exam: Normal color.  negative: Abrasion


Type of lesion: Rash (hives to bilateral arms).  negative: abrasion





Results





- Imaging and Cardiology


  ** Chest x-ray


Status: Report reviewed





VTE H&P Assessment





- Risk for VTE


Risk for VTE: Yes


Risk Level: Moderate


Risk Assessment Date: 02/19/20


Risk Assessment Time: 12:12


VTE Orders Placed or Will Be Placed: Yes





Plan





- Detailed Diagnosis and Plan


(1) Angioedema


Current Visit: Yes   Status: Acute   


Qualifiers: 


   Encounter type: subsequent encounter   Qualified Code(s): T78.3XXD - 

Angioneurotic edema, subsequent encounter   


Base Code: T78.3XXA - ANGIONEUROTIC EDEMA, INITIAL ENCOUNTER   Comment: 2/19/20:


- 2 episodes of lip swelling after taking Coreg and HCTZ


- DC both medications


- Solumedrol 60mg IVP q8h


- Pepcid 40mg IVP q12h


- Benadryl 25mg IVP q6h


- Continue to monitor   





(2) Allergic reaction


Current Visit: No   Status: Acute   Base Code: T78.40XA - ALLERGY, UNSPECIFIED, 

INITIAL ENCOUNTER   Comment: 2/19/20:


- New hives noted on bilateral arms


- Possible continued rxn to HCTZ, as patient received last dose this morning


- Continue to treat with Solumedrol, Pepcid, and Benadryl   





(3) HTN (hypertension)


Current Visit: No   Status: Acute   


Qualifiers: 


   Hypertension type: essential hypertension 


Base Code: I10 - ESSENTIAL (PRIMARY) HYPERTENSION   Comment: 2/19/20:


- DC Coreg and HCTZ


- BP remains elevated


- Starting Norvasc 10mg daily


- Continue to monitor for further rxn and BP response   





(4) Alcohol abuse


Current Visit: No   Status: Acute   Base Code: F10.10 - ALCOHOL ABUSE, UNCOMPL

ICATED   Comment: 2/19/20:


- Reports drinking daily, admits to 4 beers daily


- Tremors and sweating noted, last drink reported 24 hours ago


- CIWA scale started, with Ativan parameters noted   





(5) DVT prophylaxis


Current Visit: Yes   Status: Acute   Base Code: Z29.9 - ENCOUNTER FOR [place]

OPHYLACTIC MEASURES, UNSPECIFIED   Comment: 2/19/20:


- Moderate risk due to hospitalization


- Lovenox 40mg SC daily   





(6) Full code status


Current Visit: Yes   Status: Acute   Base Code: Z78.9 - OTHER SPECIFIED HEALTH 

STATUS   Comment: 2/19/20:


- Full code this admission